# Patient Record
Sex: FEMALE | Race: OTHER | URBAN - METROPOLITAN AREA
[De-identification: names, ages, dates, MRNs, and addresses within clinical notes are randomized per-mention and may not be internally consistent; named-entity substitution may affect disease eponyms.]

---

## 2018-04-07 ENCOUNTER — EMERGENCY (EMERGENCY)
Facility: HOSPITAL | Age: 18
LOS: 1 days | Discharge: HOME | End: 2018-04-07
Attending: EMERGENCY MEDICINE | Admitting: EMERGENCY MEDICINE
Payer: MEDICAID

## 2018-04-07 VITALS
TEMPERATURE: 98 F | OXYGEN SATURATION: 99 % | HEART RATE: 82 BPM | SYSTOLIC BLOOD PRESSURE: 104 MMHG | DIASTOLIC BLOOD PRESSURE: 64 MMHG | RESPIRATION RATE: 20 BRPM

## 2018-04-07 PROCEDURE — 73010 X-RAY EXAM OF SHOULDER BLADE: CPT | Mod: 26,RT

## 2018-04-07 PROCEDURE — 99284 EMERGENCY DEPT VISIT MOD MDM: CPT

## 2018-04-07 PROCEDURE — 73030 X-RAY EXAM OF SHOULDER: CPT | Mod: 26,RT

## 2018-04-07 NOTE — ED PROVIDER NOTE - PLAN OF CARE
Follow up with your doctor in 1-2 days.  Follow up with Orthopedics in 1-2 days see attached list.  Take Ibuprofen 400mg orally every 6 hours as needed for pain take with food.  Return to the ER for any persistent/worsening or new weakness, numbness or any concerning symptoms.

## 2018-04-07 NOTE — ED ADULT TRIAGE NOTE - CHIEF COMPLAINT QUOTE
patient experienced right upper shoulder pain that began last week s/p working out. Was taking OTC motrin with no relief. full ROM. no deformity, no distress. Already saw PMD, was told to see an orthopedist.

## 2018-04-07 NOTE — ED PROVIDER NOTE - NS_ ATTENDINGSCRIBEDETAILS _ED_A_ED_FT
Dr. Bonilla:  I personally performed the services described in the documentation, reviewed the documentation recorded by the scribe in my presence and it accurately and completely records my words and action. The scribe's documentation has been prepared under my direction and personally reviewed by me in its entirety. I confirm that the note above accurately reflects all work, treatment, procedures, and medical decision making performed by me.

## 2018-04-07 NOTE — ED PROVIDER NOTE - PROGRESS NOTE DETAILS
ANNIE Gibbons:(QUID PA) pt feels better ambulating without difficulty.  Results reviewed with patient.  Discharge reviewed and discussed with patient.

## 2018-04-07 NOTE — ED PROVIDER NOTE - MEDICAL DECISION MAKING DETAILS
17 year old female with traumatic pain to right upper back close to scapula. Patient has FROM, strong pulses, and a normal extremity exam. Suspect partial tear or strain. Will get x-rays and refer to outpatient orthopedics.

## 2018-04-07 NOTE — ED PROVIDER NOTE - CARE PLAN
Principal Discharge DX:	Shoulder pain  Assessment and plan of treatment:	Follow up with your doctor in 1-2 days.  Follow up with Orthopedics in 1-2 days see attached list.  Take Ibuprofen 400mg orally every 6 hours as needed for pain take with food.  Return to the ER for any persistent/worsening or new weakness, numbness or any concerning symptoms.

## 2018-04-07 NOTE — ED PROVIDER NOTE - OBJECTIVE STATEMENT
17 year old female with no significant PMHx, presents with upper back and right shoulder pain x 1 week. She believes her pain started while working out. She endorses the use of ibuprofen for her pain. She saw her PCP recently who told her that nothing was broken, but she is still in pain which prompted her visit. She notes that her right shoulder gets stiff throughout the day. She denies fever, chills, nausea, vomiting, tingling, weakness, numbness, or any other complaint.

## 2018-04-07 NOTE — ED PROVIDER NOTE - ATTENDING CONTRIBUTION TO CARE
Dr. Bonilla: I performed the initial face to face bedside interview with this patient regarding history of present illness, review of symptoms and past medical, social and family history.  I completed an independent physical examination.  I was the initial provider who evaluated this patient.  The history, review of symptoms and examination was documented by the scribe in my presence and I attest to the accuracy of the documentation.  I have signed out the follow up of any pending tests (i.e. labs, radiological studies) to the PA.  I have discussed the patient’s plan of care and disposition with the PA.

## 2018-04-18 ENCOUNTER — APPOINTMENT (OUTPATIENT)
Dept: ORTHOPEDIC SURGERY | Facility: CLINIC | Age: 18
End: 2018-04-18
Payer: MEDICAID

## 2018-04-18 VITALS
BODY MASS INDEX: 21.35 KG/M2 | HEIGHT: 63.5 IN | HEART RATE: 72 BPM | DIASTOLIC BLOOD PRESSURE: 68 MMHG | SYSTOLIC BLOOD PRESSURE: 101 MMHG | WEIGHT: 122 LBS

## 2018-04-18 DIAGNOSIS — G56.80 OTHER SPECIFIED MONONEUROPATHIES OF UNSPECIFIED UPPER LIMB: ICD-10-CM

## 2018-04-18 DIAGNOSIS — S16.1XXA STRAIN OF MUSCLE, FASCIA AND TENDON AT NECK LEVEL, INITIAL ENCOUNTER: ICD-10-CM

## 2018-04-18 PROCEDURE — 99202 OFFICE O/P NEW SF 15 MIN: CPT

## 2018-04-18 RX ORDER — IBUPROFEN 600 MG/1
600 TABLET, FILM COATED ORAL 3 TIMES DAILY
Qty: 21 | Refills: 0 | Status: ACTIVE | COMMUNITY
Start: 2018-04-18 | End: 1900-01-01

## 2018-04-25 ENCOUNTER — APPOINTMENT (OUTPATIENT)
Dept: ORTHOPEDIC SURGERY | Facility: HOSPITAL | Age: 18
End: 2018-04-25

## 2018-12-02 ENCOUNTER — EMERGENCY (EMERGENCY)
Facility: HOSPITAL | Age: 18
LOS: 1 days | Discharge: HOME | End: 2018-12-02
Attending: EMERGENCY MEDICINE | Admitting: EMERGENCY MEDICINE
Payer: MEDICAID

## 2018-12-02 VITALS
HEART RATE: 104 BPM | RESPIRATION RATE: 16 BRPM | TEMPERATURE: 98 F | SYSTOLIC BLOOD PRESSURE: 128 MMHG | OXYGEN SATURATION: 100 % | DIASTOLIC BLOOD PRESSURE: 82 MMHG

## 2018-12-02 LAB
ALBUMIN SERPL ELPH-MCNC: 4.3 G/DL — SIGNIFICANT CHANGE UP (ref 3.3–5)
ALP SERPL-CCNC: 77 U/L — SIGNIFICANT CHANGE UP (ref 40–120)
ALT FLD-CCNC: 12 U/L — SIGNIFICANT CHANGE UP (ref 4–33)
AST SERPL-CCNC: 16 U/L — SIGNIFICANT CHANGE UP (ref 4–32)
BASOPHILS # BLD AUTO: 0.08 K/UL — SIGNIFICANT CHANGE UP (ref 0–0.2)
BASOPHILS NFR BLD AUTO: 0.7 % — SIGNIFICANT CHANGE UP (ref 0–2)
BILIRUB SERPL-MCNC: 0.4 MG/DL — SIGNIFICANT CHANGE UP (ref 0.2–1.2)
BUN SERPL-MCNC: 10 MG/DL — SIGNIFICANT CHANGE UP (ref 7–23)
CALCIUM SERPL-MCNC: 9.6 MG/DL — SIGNIFICANT CHANGE UP (ref 8.4–10.5)
CHLORIDE SERPL-SCNC: 98 MMOL/L — SIGNIFICANT CHANGE UP (ref 98–107)
CO2 SERPL-SCNC: 25 MMOL/L — SIGNIFICANT CHANGE UP (ref 22–31)
CREAT SERPL-MCNC: 0.67 MG/DL — SIGNIFICANT CHANGE UP (ref 0.5–1.3)
CRP SERPL-MCNC: 10.9 MG/L — HIGH
EOSINOPHIL # BLD AUTO: 0.4 K/UL — SIGNIFICANT CHANGE UP (ref 0–0.5)
EOSINOPHIL NFR BLD AUTO: 3.4 % — SIGNIFICANT CHANGE UP (ref 0–6)
ERYTHROCYTE [SEDIMENTATION RATE] IN BLOOD: 23 MM/HR — SIGNIFICANT CHANGE UP (ref 4–25)
GLUCOSE SERPL-MCNC: 87 MG/DL — SIGNIFICANT CHANGE UP (ref 70–99)
HCT VFR BLD CALC: 37.7 % — SIGNIFICANT CHANGE UP (ref 34.5–45)
HGB BLD-MCNC: 12.4 G/DL — SIGNIFICANT CHANGE UP (ref 11.5–15.5)
IMM GRANULOCYTES # BLD AUTO: 0.03 # — SIGNIFICANT CHANGE UP
IMM GRANULOCYTES NFR BLD AUTO: 0.3 % — SIGNIFICANT CHANGE UP (ref 0–1.5)
LYMPHOCYTES # BLD AUTO: 1.87 K/UL — SIGNIFICANT CHANGE UP (ref 1–3.3)
LYMPHOCYTES # BLD AUTO: 16 % — SIGNIFICANT CHANGE UP (ref 13–44)
MCHC RBC-ENTMCNC: 29.7 PG — SIGNIFICANT CHANGE UP (ref 27–34)
MCHC RBC-ENTMCNC: 32.9 % — SIGNIFICANT CHANGE UP (ref 32–36)
MCV RBC AUTO: 90.2 FL — SIGNIFICANT CHANGE UP (ref 80–100)
MONOCYTES # BLD AUTO: 0.8 K/UL — SIGNIFICANT CHANGE UP (ref 0–0.9)
MONOCYTES NFR BLD AUTO: 6.8 % — SIGNIFICANT CHANGE UP (ref 2–14)
NEUTROPHILS # BLD AUTO: 8.54 K/UL — HIGH (ref 1.8–7.4)
NEUTROPHILS NFR BLD AUTO: 72.8 % — SIGNIFICANT CHANGE UP (ref 43–77)
NRBC # FLD: 0 — SIGNIFICANT CHANGE UP
PLATELET # BLD AUTO: 340 K/UL — SIGNIFICANT CHANGE UP (ref 150–400)
PMV BLD: 9.6 FL — SIGNIFICANT CHANGE UP (ref 7–13)
POTASSIUM SERPL-MCNC: 4.4 MMOL/L — SIGNIFICANT CHANGE UP (ref 3.5–5.3)
POTASSIUM SERPL-SCNC: 4.4 MMOL/L — SIGNIFICANT CHANGE UP (ref 3.5–5.3)
PROT SERPL-MCNC: 7.7 G/DL — SIGNIFICANT CHANGE UP (ref 6–8.3)
RBC # BLD: 4.18 M/UL — SIGNIFICANT CHANGE UP (ref 3.8–5.2)
RBC # FLD: 12.5 % — SIGNIFICANT CHANGE UP (ref 10.3–14.5)
SODIUM SERPL-SCNC: 138 MMOL/L — SIGNIFICANT CHANGE UP (ref 135–145)
WBC # BLD: 11.72 K/UL — HIGH (ref 3.8–10.5)
WBC # FLD AUTO: 11.72 K/UL — HIGH (ref 3.8–10.5)

## 2018-12-02 PROCEDURE — 71046 X-RAY EXAM CHEST 2 VIEWS: CPT | Mod: 26

## 2018-12-02 PROCEDURE — 99283 EMERGENCY DEPT VISIT LOW MDM: CPT

## 2018-12-02 NOTE — ED PROVIDER NOTE - PHYSICAL EXAMINATION
Skin: LE b/l symmetrical raised tender reddened nodules to anterior shins , discrete swollen areas , mildly warm , no fluctuance , no tender calf , no ecchymosis

## 2018-12-02 NOTE — ED PROVIDER NOTE - MEDICAL DECISION MAKING DETAILS
17 y/o F with no significant PMHx presents to ED with 10 days of likely E.Nodosum rash. Patient denies joint paint or family history. Plan to obtain chest x-ray, check labs, and follow up with PMD.

## 2018-12-02 NOTE — ED PROVIDER NOTE - OBJECTIVE STATEMENT
17 y/o F with no significant PMHx presents to ED with rash since 3 weeks. Pt states that on Thanksgiving morning she noticed a few red bumps on her legs located on the shin.  Pt notes that later that night they had grown in size and they had no itching or pain at the time. Pt describes that since the time the bumps continue to grow in size. Associated the symptoms are pain and itching to localized area that began yesterday, redness, and inflammation. Pt has been taking Zyrtec and Ibuprofen with mild relief of the symptoms. The bumps resolved for a few days and then returned. Pt is allergic to mold, dust, cats, pets, and shellfish. Pt works with pets everyday. 17 y/o F with no significant PMHx presents to ED with rash since 10 days. Pt states that on Thanksgiving morning she noticed a few red bumps on her legs located on the shin.  Pt notes that later that night they had grown in size and they had no itching or pain at the time. Pt describes that since the time the bumps continue to grow in size. Associated the symptoms are pain and itching to localized area that began yesterday, redness, and inflammation. Pt has been taking Zyrtec and Ibuprofen with mild relief of the symptoms. The bumps resolved for a few days and then returned. Pt is allergic to mold, dust, cats, pets, and shellfish. Pt works with pets everyday.

## 2018-12-03 LAB
EBV EA AB TITR SER IF: POSITIVE — SIGNIFICANT CHANGE UP
EBV EA IGG SER-ACNC: NEGATIVE — SIGNIFICANT CHANGE UP
EBV PATRN SPEC IB-IMP: SIGNIFICANT CHANGE UP
EBV VCA IGG AVIDITY SER QL IA: POSITIVE — SIGNIFICANT CHANGE UP
EBV VCA IGM TITR FLD: NEGATIVE — SIGNIFICANT CHANGE UP

## 2020-07-23 ENCOUNTER — APPOINTMENT (OUTPATIENT)
Dept: POPULATION HEALTH | Facility: CLINIC | Age: 20
End: 2020-07-23
Payer: MEDICAID

## 2020-07-23 DIAGNOSIS — Z23 ENCOUNTER FOR IMMUNIZATION: ICD-10-CM

## 2020-07-23 PROCEDURE — 90675 RABIES VACCINE IM: CPT

## 2020-07-23 PROCEDURE — 90471 IMMUNIZATION ADMIN: CPT

## 2020-07-30 ENCOUNTER — APPOINTMENT (OUTPATIENT)
Dept: POPULATION HEALTH | Facility: CLINIC | Age: 20
End: 2020-07-30
Payer: MEDICAID

## 2020-07-30 PROCEDURE — 90675 RABIES VACCINE IM: CPT

## 2020-07-30 PROCEDURE — 90471 IMMUNIZATION ADMIN: CPT

## 2020-08-20 ENCOUNTER — APPOINTMENT (OUTPATIENT)
Dept: POPULATION HEALTH | Facility: CLINIC | Age: 20
End: 2020-08-20
Payer: MEDICAID

## 2020-08-20 PROCEDURE — 90471 IMMUNIZATION ADMIN: CPT

## 2020-08-20 PROCEDURE — 90675 RABIES VACCINE IM: CPT

## 2022-04-13 NOTE — ED ADULT TRIAGE NOTE - NS ED NURSE BANDS TYPE
"[facility]   Physical Therapy Treatment Note     Name: Dorothy Burt  Clinic Number: [id number]    Therapy Diagnosis:   Encounter Diagnoses   Name Primary?    Decreased range of motion of left shoulder Yes    Decreased functional mobility and endurance      Physician: Sara Segundo PA    Visit Date: 4/13/2022    Physician Orders: PT Eval and Treat   2-3 x per week x 6 weeks   RTC strengthening, ROM, and other indicated modalities     Medical Diagnosis from Referral:   M75.42 (ICD-10-CM) - Shoulder impingement, left   M19.019 (ICD-10-CM) - AC (acromioclavicular) arthritis     Evaluation Date: 4/4/2022  Authorization Period Expiration: 3/25/23  Plan of Care Expiration: 5/20/22  Progress Note Due: 5/20/22  Visit # / Visits authorized: 4/12   FOTO: 4/5   PTA Visit: 0/5    Precautions: Standard and asthma      Time In: 1:05 pm  Time Out: 2:00 pm  Total Billable Time: 55 minutes (TEx3, MTx1)    SUBJECTIVE     Pt reports: pain still in the left shoulder, and thinks it woke her up 3-4 times last night. Did use a towel under her arm plus her pillow roll that helped. Sleeping on a couch since her house is still being worked on.   She was compliant with home exercise program.  Response to previous treatment: eval only  Functional change: eval only    Pain: 5-6/10  Location: left shoulder      OBJECTIVE     4/6/22:  left PROM:  Flexion: 100 degree  Abduction: 90 degree   external rotation: 35-40 degree  internal rotation: 60 degree     Treatment     Dorothy received the treatments listed below:      Dorothy received therapeutic exercises to develop strength, endurance, ROM, flexibility, posture and core stabilization for 40 minutes including:    Isometrics: see below   Flexion: 10x5"   Abduction: 10x5"   Extension: 10x5"   external rotation: 10x5"   internal rotation: 10x5"  Supine shoulder external rotation: 2x10 left, 1#  Supine bicep curls: 30x 1# left  Supine chest press AAROM: 2x8, cues to " "avoid elbows from flaring out and path of elevation  Side lying external rotation: 2x10 left   scapular retraction with shrug: 30x5" holds  Pulleys: 2' flexion, gentle ROM     Table slides flexion: 2x8x5" - not done today  Table slides external rotation with wedge: 10x5" - not done today     Dorothy received the following manual therapy techniques: Joint mobilizations, Myofacial release and Soft tissue Mobilization were applied to the: left shoulder for 15 minutes, including:    Scapular mobilizations in side lying  left shoulder PROM with Grade I-III GHJ mobilizations (inferior, anterior and posterior)    Patient Education and Home Exercises     Home Exercises Provided and Patient Education Provided   Education provided:   - continuing with home exercise program  - anatomy and referred pain    Written Home Exercises Provided: Patient instructed to cont prior HEP. Exercises were reviewed and Dorothy was able to demonstrate them prior to the end of the session.  Dorothy demonstrated good  understanding of the education provided. See EMR under Patient Instructions for exercises provided during therapy sessions    ASSESSMENT     Dorothy is a 67 y.o. female referred to outpatient Physical Therapy with a medical diagnosis of nontraumatic rotator cuff tear. Moderate irritability level present today, and working on isometrics and very gentle AAROM at this time. Future referral back to MD may be required if condition does not improve over the next 2-3 weeks.   Dorothy Is progressing well towards her goals.   Pt prognosis is Guarded.     Pt will continue to benefit from skilled outpatient physical therapy to address the deficits listed in the problem list box on initial evaluation, provide pt/family education and to maximize pt's level of independence in the home and community environment.     Pt's spiritual, cultural and educational needs considered and pt agreeable to plan of care and goals.     Anticipated Barriers for therapy: severity " of symptoms, possible/expected tear     Goals:  Short Term Goals: 3 weeks   1.  Patient will report < 6/10 shoulder pain at worst for improved ADL performance. PROGRESSING; NOT MET  2.  Patient will demonstrate L shoulder flexion AROM > 85 deg to improve overhead reach. PROGRESSING; NOT MET  3.  Patient will demonstrate left shoulder external rotation PROM > 45 degree for improved hair care. PROGRESSING; NOT MET     Long Term Goals: 12 weeks   1. Patient will report ability to wash her hair with her left hand with only a little bit of difficulty. PROGRESSING; NOT MET  2. Patient will demonstrate ability to reach overhead and remove a 2 lb object 5 times to simulate removing objects from an overhead shelf w/o an increase in symptoms. PROGRESSING; NOT MET  3. Patient will demonstrate all upper extremity strength via MicroFET at least 80% of right for improved ADL and IADL performance. PROGRESSING; NOT MET  4. Patient will improve FOTO to < 42% to improve ADL performance. PROGRESSING; NOT MET  5. Patient will report ability to dress upper and lower body with only a little bit of difficulty. PROGRESSING; NOT MET    PLAN     Continue with POC    Plan of care Certification: 4/4/2022 to 5/20/22.     Jose C Lutz, PT      Name band;

## 2024-07-26 ENCOUNTER — EMERGENCY (EMERGENCY)
Facility: HOSPITAL | Age: 24
LOS: 1 days | Discharge: HOME | End: 2024-07-26
Admitting: EMERGENCY MEDICINE
Payer: COMMERCIAL

## 2024-07-26 VITALS — HEIGHT: 63 IN | WEIGHT: 149.91 LBS

## 2024-07-26 PROCEDURE — 73110 X-RAY EXAM OF WRIST: CPT | Mod: 26,LT

## 2024-07-26 PROCEDURE — 99283 EMERGENCY DEPT VISIT LOW MDM: CPT

## 2024-07-26 RX ORDER — ACETAMINOPHEN 325 MG
975 TABLET ORAL ONCE
Refills: 0 | Status: COMPLETED | OUTPATIENT
Start: 2024-07-26 | End: 2024-07-26

## 2024-07-26 RX ADMIN — Medication 975 MILLIGRAM(S): at 21:39

## 2024-07-26 NOTE — ED PROVIDER NOTE - CLINICAL SUMMARY MEDICAL DECISION MAKING FREE TEXT BOX
27 yo female with left wrist bruising and injury s/p fall    Plan: pain control and xrays, likely splint

## 2024-07-26 NOTE — ED ADULT NURSE NOTE - OBJECTIVE STATEMENT
Patient received wellness, a&ox4, ambulatory. c/o injury to left wrist injury occurring this evening. Pt states got tangled when walking her dog and fell into a chair that fell onto her wrist. No head strike. Pt presents with left wrist deformity and swelling. Pt denies chest pain, sob, n+v, headache, dizziness, Minimal ROM, + pulses. Breathing even, unlabored. Pt medicated as per MAR.

## 2024-07-26 NOTE — ED ADULT NURSE NOTE - NSFALLUNIVINTERV_ED_ALL_ED
Bed/Stretcher in lowest position, wheels locked, appropriate side rails in place/Call bell, personal items and telephone in reach/Instruct patient to call for assistance before getting out of bed/chair/stretcher/Non-slip footwear applied when patient is off stretcher/[place] to call system/Physically safe environment - no spills, clutter or unnecessary equipment/Purposeful proactive rounding/Room/bathroom lighting operational, light cord in reach

## 2024-07-26 NOTE — ED PROVIDER NOTE - PATIENT PORTAL LINK FT
You can access the FollowMyHealth Patient Portal offered by [facility] by registering at the following website: [email or web address]. By joining [facility]’s FollowMyHealth portal, you will also be able to view your health information using other applications (apps) compatible with our system.

## 2024-07-26 NOTE — ED PROVIDER NOTE - OBJECTIVE STATEMENT
23 yo female with no PMHx presenting to ED with complainta of left wrist pain and injury tonight. Patient was holding a leash of her dog while sitting on a chair. the dog ran behind her pulling the chair backward. The patient fell off the chair and her wrist got caught between the chair and the ground. No LOC, or head injury.

## 2024-07-26 NOTE — ED ADULT TRIAGE NOTE - CHIEF COMPLAINT QUOTE
Pt c/o left wrist pain and swelling states was holding dog on a leash while sitting on a chair and dog ran pulling her and chair down, hand was sandwiched between ground and chair. Denies medical hx
